# Patient Record
Sex: FEMALE | Race: WHITE | NOT HISPANIC OR LATINO | Employment: OTHER | URBAN - NONMETROPOLITAN AREA
[De-identification: names, ages, dates, MRNs, and addresses within clinical notes are randomized per-mention and may not be internally consistent; named-entity substitution may affect disease eponyms.]

---

## 2018-11-01 ENCOUNTER — OFFICE VISIT (OUTPATIENT)
Dept: CARDIOLOGY | Facility: CLINIC | Age: 63
End: 2018-11-01

## 2018-11-01 VITALS
HEIGHT: 65 IN | WEIGHT: 285 LBS | BODY MASS INDEX: 47.48 KG/M2 | DIASTOLIC BLOOD PRESSURE: 70 MMHG | HEART RATE: 96 BPM | SYSTOLIC BLOOD PRESSURE: 110 MMHG

## 2018-11-01 DIAGNOSIS — R01.1 MURMUR, CARDIAC: ICD-10-CM

## 2018-11-01 DIAGNOSIS — E78.1 HYPERTRIGLYCERIDEMIA: ICD-10-CM

## 2018-11-01 DIAGNOSIS — E88.81 METABOLIC SYNDROME: ICD-10-CM

## 2018-11-01 DIAGNOSIS — E03.8 OTHER SPECIFIED HYPOTHYROIDISM: ICD-10-CM

## 2018-11-01 DIAGNOSIS — R07.89 OTHER CHEST PAIN: ICD-10-CM

## 2018-11-01 DIAGNOSIS — E11.9 TYPE 2 DIABETES MELLITUS WITHOUT COMPLICATION, WITHOUT LONG-TERM CURRENT USE OF INSULIN (HCC): ICD-10-CM

## 2018-11-01 DIAGNOSIS — I10 ESSENTIAL HYPERTENSION: ICD-10-CM

## 2018-11-01 DIAGNOSIS — R94.31 ABNORMAL EKG: Primary | ICD-10-CM

## 2018-11-01 DIAGNOSIS — I25.6 SILENT MYOCARDIAL ISCHEMIA: ICD-10-CM

## 2018-11-01 PROCEDURE — 99204 OFFICE O/P NEW MOD 45 MIN: CPT | Performed by: INTERNAL MEDICINE

## 2018-11-01 PROCEDURE — 93000 ELECTROCARDIOGRAM COMPLETE: CPT | Performed by: INTERNAL MEDICINE

## 2018-11-01 RX ORDER — FUROSEMIDE 20 MG/1
20 TABLET ORAL 2 TIMES DAILY
COMMUNITY

## 2018-11-01 RX ORDER — CLONAZEPAM 0.5 MG/1
0.5 TABLET ORAL 2 TIMES DAILY
COMMUNITY

## 2018-11-01 RX ORDER — ALBUTEROL SULFATE 90 UG/1
2 AEROSOL, METERED RESPIRATORY (INHALATION) 2 TIMES DAILY
COMMUNITY

## 2018-11-01 RX ORDER — FENOFIBRATE 160 MG/1
160 TABLET ORAL DAILY
COMMUNITY

## 2018-11-01 RX ORDER — LEVOTHYROXINE SODIUM 0.15 MG/1
150 TABLET ORAL DAILY
COMMUNITY

## 2018-11-01 RX ORDER — LACTULOSE 10 G/15ML
10 SOLUTION ORAL DAILY
COMMUNITY

## 2018-11-01 RX ORDER — QUETIAPINE FUMARATE 200 MG/1
200 TABLET, FILM COATED ORAL NIGHTLY
COMMUNITY

## 2018-11-01 RX ORDER — GLIPIZIDE 5 MG/1
5 TABLET, FILM COATED, EXTENDED RELEASE ORAL DAILY
COMMUNITY

## 2018-11-01 RX ORDER — DOCUSATE SODIUM 100 MG/1
100 CAPSULE, LIQUID FILLED ORAL 2 TIMES DAILY
COMMUNITY

## 2018-11-01 RX ORDER — PRAZOSIN HYDROCHLORIDE 2 MG/1
4 CAPSULE ORAL NIGHTLY
COMMUNITY

## 2018-11-01 RX ORDER — PALIPERIDONE 9 MG/1
9 TABLET, EXTENDED RELEASE ORAL EVERY MORNING
COMMUNITY

## 2018-11-01 RX ORDER — ASCORBIC ACID 500 MG
1 TABLET ORAL DAILY
COMMUNITY

## 2018-11-01 RX ORDER — RANITIDINE 300 MG/1
300 TABLET ORAL NIGHTLY
COMMUNITY

## 2018-11-01 NOTE — PATIENT INSTRUCTIONS
Mediterranean Diet  A Mediterranean diet refers to food and lifestyle choices that are based on the traditions of countries located on the [place]. This way of eating has been shown to help prevent certain conditions and improve outcomes for people who have chronic diseases, like kidney disease and heart disease.  What are tips for following this plan?  Lifestyle  · Cook and eat meals together with your family, when possible.  · Drink enough fluid to keep your urine clear or pale yellow.  · Be physically active every day. This includes:  ? Aerobic exercise like running or swimming.  ? Leisure activities like gardening, walking, or housework.  · Get 7-8 hours of sleep each night.  · If recommended by your health care provider, drink red wine in moderation. This means 1 glass a day for nonpregnant women and 2 glasses a day for men. A glass of wine equals 5 oz (150 mL).  Reading food labels  · Check the serving size of packaged foods. For foods such as rice and pasta, the serving size refers to the amount of cooked product, not dry.  · Check the total fat in packaged foods. Avoid foods that have saturated fat or trans fats.  · Check the ingredients list for added sugars, such as corn syrup.  Shopping  · At the grocery store, buy most of your food from the areas near the walls of the store. This includes:  ? Fresh fruits and vegetables (produce).  ? Grains, beans, nuts, and seeds. Some of these may be available in unpackaged forms or large amounts (in bulk).  ? Fresh seafood.  ? Poultry and eggs.  ? Low-fat dairy products.  · Buy whole ingredients instead of prepackaged foods.  · Buy fresh fruits and vegetables in-season from local farmers markets.  · Buy frozen fruits and vegetables in resealable bags.  · If you do not have access to quality fresh seafood, buy precooked frozen shrimp or canned fish, such as tuna, salmon, or sardines.  · Buy small amounts of raw or cooked vegetables, salads, or olives from the  deli or salad bar at your store.  · Stock your pantry so you always have certain foods on hand, such as olive oil, canned tuna, canned tomatoes, rice, pasta, and beans.  Cooking  · Cook foods with extra-virgin olive oil instead of using butter or other vegetable oils.  · Have meat as a side dish, and have vegetables or grains as your main dish. This means having meat in small portions or adding small amounts of meat to foods like pasta or stew.  · Use beans or vegetables instead of meat in common dishes like chili or lasagna.  · [place] with different cooking methods. Try roasting or broiling vegetables instead of steaming or sautéeing them.  · Add frozen vegetables to soups, stews, pasta, or rice.  · Add nuts or seeds for added healthy fat at each meal. You can add these to yogurt, salads, or vegetable dishes.  · Marinate fish or vegetables using olive oil, lemon juice, garlic, and fresh herbs.  Meal planning  · Plan to eat 1 vegetarian meal one day each week. Try to work up to 2 vegetarian meals, if possible.  · Eat seafood 2 or more times a week.  · Have healthy snacks readily available, such as:  ? Vegetable sticks with hummus.  ? Greek yogurt.  ? Fruit and nut trail mix.  · Eat balanced meals throughout the week. This includes:  ? Fruit: 2-3 servings a day  ? Vegetables: 4-5 servings a day  ? Low-fat dairy: 2 servings a day  ? Fish, poultry, or lean meat: 1 serving a day  ? Beans and legumes: 2 or more servings a week  ? Nuts and seeds: 1-2 servings a day  ? Whole grains: 6-8 servings a day  ? Extra-virgin olive oil: 3-4 servings a day  · Limit red meat and sweets to only a few servings a month  What are my food choices?  · Mediterranean diet  ? Recommended  ? Grains: Whole-grain pasta. Brown rice. Bulgar wheat. Polenta. Couscous. Whole-wheat bread. Oatmeal. Quinoa.  ? Vegetables: Artichokes. Beets. Broccoli. Cabbage. Carrots. Eggplant. Green beans. Chard. Kale. Spinach. Onions. Leeks. Peas. Squash.  Tomatoes. Peppers. Radishes.  ? Fruits: Apples. Apricots. Avocado. Berries. Bananas. Cherries. Dates. Figs. Grapes. Jorge L. Melon. Oranges. Peaches. Plums. Pomegranate.  ? Meats and other protein foods: Beans. Almonds. Sunflower seeds. Pine nuts. Peanuts. Cod. [place]. Scallops. Shrimp. Tuna. Tilapia. Clams. Oysters. Eggs.  ? Dairy: Low-fat milk. Cheese. Greek yogurt.  ? Beverages: Water. Red wine. Herbal tea.  ? Fats and oils: Extra virgin olive oil. Avocado oil. Grape seed oil.  ? Sweets and desserts: Greek yogurt with honey. Baked apples. Poached pears. Trail mix.  ? Seasoning and other foods: Basil. Cilantro. Coriander. Cumin. Mint. Parsley. Lars. Rosemary. Tarragon. Garlic. Oregano. Thyme. Pepper. Balsalmic vinegar. Tahini. Hummus. Tomato sauce. Olives. Mushrooms.  ? Limit these  ? Grains: Prepackaged pasta or rice dishes. Prepackaged cereal with added sugar.  ? Vegetables: Deep fried potatoes (french fries).  ? Fruits: Fruit canned in syrup.  ? Meats and other protein foods: Beef. Pork. Lamb. Poultry with skin. Hot dogs. Thakkar.  ? Dairy: Ice cream. Sour cream. Whole milk.  ? Beverages: Juice. Sugar-sweetened soft drinks. Beer. Liquor and spirits.  ? Fats and oils: Butter. Canola oil. Vegetable oil. Beef fat (tallow). Lard.  ? Sweets and desserts: Cookies. Cakes. Pies. Candy.  ? Seasoning and other foods: Mayonnaise. Premade sauces and marinades.  ? The items listed may not be a complete list. Talk with your dietitian about what dietary choices are right for you.  Summary  · The Mediterranean diet includes both food and lifestyle choices.  · Eat a variety of fresh fruits and vegetables, beans, nuts, seeds, and whole grains.  · Limit the amount of red meat and sweets that you eat.  · Talk with your health care provider about whether it is safe for you to drink red wine in moderation. This means 1 glass a day for nonpregnant women and 2 glasses a day for men. A glass of wine equals 5 oz (150 mL).  This information  is not intended to replace advice given to you by your health care provider. Make sure you discuss any questions you have with your health care provider.  Document Released: 08/10/2017 Document Revised: 09/12/2017 Document Reviewed: 08/10/2017  [employer] Interactive Patient Education © 2018 [employer]

## 2018-11-01 NOTE — PROGRESS NOTES
"Chief Complaint   Patient presents with   • Establish Care     Patient referred due to abnormal EKG ( attempting to obtain EKG from PCP). Patient is unable to inform of any pain and unable to communicate needs, sister reports had one episode 2 years ago that she felt may have been some chest pain. Sister reports unable to weigh patient due to balance issues, she thinks she weighs 285lb.   • Shortness of Breath     sister reports that she has some with over exertion. Does have problems with balance and sister states \"she will freeze in place when she gets up\".         CARDIAC COMPLAINTS  chest pressure/discomfort and dyspnea      Subjective   Maru Vickers is a 62 y.o. female came in today with her sister and her caregiver.  She has history of mental retardation since childhood.  She has been taking care of by the family very well.  She is not able to communicate adequately.  They moved to [place] recently and was seen at your office.  She had an EKG which was done secondary to her psychiatrist's request to monitor her medications.  It was abnormal and hence she is referred for further evaluation.  Her sister, who came with her said that she did have an episode of chest pain about a year or 2 ago.  They were outside walking.  The patient apparently stopped and clutched her chest and apparently said chest pain.  She apparently doesn't like to walk much, and the family felt that was the reason, she was complaining.  After about 15 minutes, she was able to walk again.  They also noticed, that in the last 6 months she does get shortness of breath on exertion.  She also has been gaining weight, since she eats lot of unhealthy food. She apparently had labs done at your office.  She is not a smoker.  Diabetes, hypertension and ischemic heart disease does run in the family.    No past surgical history on file.    Current Outpatient Prescriptions   Medication Sig Dispense Refill   • albuterol (PROVENTIL HFA;VENTOLIN HFA) " 108 (90 Base) MCG/ACT inhaler Inhale 2 puffs 2 (Two) Times a Day.     • clonazePAM (KlonoPIN) 0.5 MG tablet Take 0.5 mg by mouth 2 (Two) Times a Day.     • Cyanocobalamin (VITAMIN B 12 PO) Take  by mouth Daily.     • docusate sodium (COLACE) 100 MG capsule Take 100 mg by mouth 2 (Two) Times a Day.     • fenofibrate 160 MG tablet Take 160 mg by mouth Daily.     • FOLIC ACID PO Take  by mouth Daily.     • furosemide (LASIX) 20 MG tablet Take 20 mg by mouth 2 (Two) Times a Day.     • glipiZIDE (GLUCOTROL XL) 5 MG ER tablet Take 5 mg by mouth Daily.     • lactulose (CHRONULAC) 10 GM/15ML solution Take 10 g by mouth Daily.     • levothyroxine (SYNTHROID, LEVOTHROID) 150 MCG tablet Take 150 mcg by mouth Daily.     • Multiple Vitamin (MULTIVITAMIN) tablet tablet Take 1 tablet by mouth Daily.     • paliperidone (INVEGA) 9 MG 24 hr tablet Take 9 mg by mouth Every Morning.     • prazosin (MINIPRESS) 2 MG capsule Take 4 mg by mouth Every Night.     • QUEtiapine (SEROquel) 200 MG tablet Take 200 mg by mouth Every Night.     • raNITIdine (ZANTAC) 300 MG tablet Take 300 mg by mouth Every Night.     • sitaGLIPtin-metFORMIN (JANUMET)  MG per tablet Take 1 tablet by mouth 2 (Two) Times a Day With Meals.     • aspirin 81 MG tablet Take 1 tablet by mouth Daily. 30 tablet 11     No current facility-administered medications for this visit.            ALLERGIES:  Patient has no known allergies.    Past Medical History:   Diagnosis Date   • Anemia    • Dementia    • Diabetes mellitus (CMS/HCC)    • GERD (gastroesophageal reflux disease)    • Hx of bilateral breast reduction surgery 1978   • Hx of dilation and curettage    • Hyperlipidemia    • Hypothyroidism    • Idiopathic orofacial dystonia    • Mental retardation    • Parkinson disease (CMS/HCC)    • Residual schizophrenia (CMS/HCC)    • Schizoid personality disorder (CMS/HCC)        History   Smoking Status   • Never Smoker   Smokeless Tobacco   • Never Used          Family  "History   Problem Relation Age of Onset   • Heart disease Mother    • Diabetes Mother    • Heart disease Father    • Heart attack Father    • Heart failure Father    • Cancer Father    • Thyroid disease Sister    • No Known Problems Brother    • Heart disease Paternal Grandmother    • Stroke Paternal Grandfather    • Heart disease Sister    • Heart attack Sister    • Hypertension Sister    • Diabetes Sister    • Parkinsonism Sister    • Lupus Sister    • Thyroid disease Sister    • No Known Problems Brother    • No Known Problems Brother        Review of Systems   Constitution: Positive for malaise/fatigue. Negative for decreased appetite.   HENT: Negative for congestion and sore throat.    Eyes: Negative for blurred vision.   Cardiovascular: Positive for chest pain and dyspnea on exertion.   Respiratory: Positive for shortness of breath. Negative for snoring.    Endocrine: Negative for cold intolerance and heat intolerance.   Hematologic/Lymphatic: Negative for adenopathy. Does not bruise/bleed easily.   Skin: Negative for itching, nail changes and skin cancer.   Musculoskeletal: Positive for arthritis. Negative for myalgias.   Gastrointestinal: Negative for abdominal pain, dysphagia and heartburn.   Genitourinary: Negative for bladder incontinence and frequency.   Neurological: Negative for dizziness, light-headedness, seizures and vertigo.   Psychiatric/Behavioral: Negative for altered mental status.   Allergic/Immunologic: Negative for environmental allergies and hives.       Diabetes- Yes  Thyroid- abnormal    Objective     /70 (BP Location: Right arm)   Pulse 96   Ht 165 cm (64.96\")   Wt 129 kg (285 lb)   BMI 47.48 kg/m²     Physical Exam   Constitutional: She is oriented to person, place, and time. She appears well-developed and well-nourished.   HENT:   Head: Normocephalic.   Nose: Nose normal.   Eyes: Pupils are equal, round, and reactive to light. EOM are normal.   Neck: Normal range of motion. " Neck supple.   Cardiovascular: Normal rate, regular rhythm, S1 normal and S2 normal.    Murmur heard.  Pulmonary/Chest: Effort normal and breath sounds normal.   Abdominal: Soft. Bowel sounds are normal.   Musculoskeletal: Normal range of motion. She exhibits edema.   Neurological: She is alert and oriented to person, place, and time.   Skin: Skin is warm and dry.   Psychiatric: She has a normal mood and affect.   Nursing note and vitals reviewed.        ECG 12 Lead  Date/Time: 11/1/2018 12:48 PM  Performed by: ATA DAMON  Authorized by: ATA DAMON   Comparison: compared with previous ECG from 10/30/2018  Similar to previous ECG  Rhythm: sinus rhythm  Rate: normal  QRS axis: left  Q waves: II, III and aVF  Clinical impression: abnormal ECG              Assessment/Plan   Patient's Body mass index is 47.48 kg/m². BMI is above normal parameters. Recommendations include: educational material and nutrition counseling.     Maru was seen today for establish care and shortness of breath.    Diagnoses and all orders for this visit:    Abnormal EKG  -     Stress Test With Myocardial Perfusion One Day; Future    Type 2 diabetes mellitus without complication, without long-term current use of insulin (CMS/[facility])  -     Hemoglobin A1c; Future    Essential hypertension  -     Comprehensive Metabolic Panel; Future    Hypertriglyceridemia  -     Lipid Panel; Future    Metabolic syndrome  -     CBC & Differential; Future    Other specified hypothyroidism  -     TSH; Future    Silent myocardial ischemia  -     Stress Test With Myocardial Perfusion One Day; Future    Other chest pain  -     Stress Test With Myocardial Perfusion One Day; Future  -     Adult Transthoracic Echo Complete W/ Cont if Necessary Per Protocol; Future  -     High Sensitivity CRP; Future    Murmur, cardiac  -     Adult Transthoracic Echo Complete W/ Cont if Necessary Per Protocol; Future     At baseline, her heart rate is upper limit of normal  and the blood pressure is stable.  Her EKG showed normal sinus rhythm, old inferior wall infarct and left axis deviation.  Her BMI is almost 48.  Her clinical examination is unremarkable other than, systolic murmur at the mitral area and mild pedal edema.  I had a very long talk with her and the sister.  I explained to them about the abnormal EKG.  I'm not sure whether the chest pain she had few years ago was an MI or not.  I scheduled her to undergo an echocardiogram, to evaluate the LV function, valvular structures and any wall motion abnormalities.  She also need a stress test in the form of Lexiscan to evaluate for any infarct and also to look for any potential ischemia.  If there is evidence of ischemia, then she needs to be treated more aggressively with beta blockers and statins.  Meanwhile, I gave him papers regarding different diet including Mediterranean diet.  Based on the results of these tests, further recommendations will be made.  I will really appreciate, if you can send me copy of her labs.               Electronically signed by Crystal Hunter MD November 1, 2018 12:39 PM

## 2018-11-12 ENCOUNTER — HOSPITAL ENCOUNTER (OUTPATIENT)
Dept: CARDIOLOGY | Facility: HOSPITAL | Age: 63
Discharge: HOME | End: 2018-11-12
Attending: INTERNAL MEDICINE

## 2018-11-12 ENCOUNTER — LAB (OUTPATIENT)
Dept: LAB | Facility: HOSPITAL | Age: 63
End: 2018-11-12
Attending: INTERNAL MEDICINE

## 2018-11-12 VITALS — BODY MASS INDEX: 47.38 KG/M2 | HEIGHT: 65 IN | WEIGHT: 284.39 LBS

## 2018-11-12 DIAGNOSIS — E78.1 HYPERTRIGLYCERIDEMIA: ICD-10-CM

## 2018-11-12 DIAGNOSIS — I25.6 SILENT MYOCARDIAL ISCHEMIA: ICD-10-CM

## 2018-11-12 DIAGNOSIS — E03.8 OTHER SPECIFIED HYPOTHYROIDISM: ICD-10-CM

## 2018-11-12 DIAGNOSIS — R07.89 OTHER CHEST PAIN: ICD-10-CM

## 2018-11-12 DIAGNOSIS — E11.9 TYPE 2 DIABETES MELLITUS WITHOUT COMPLICATION, WITHOUT LONG-TERM CURRENT USE OF INSULIN (HCC): ICD-10-CM

## 2018-11-12 DIAGNOSIS — R01.1 MURMUR, CARDIAC: ICD-10-CM

## 2018-11-12 DIAGNOSIS — E88.81 METABOLIC SYNDROME: ICD-10-CM

## 2018-11-12 DIAGNOSIS — R94.31 ABNORMAL EKG: ICD-10-CM

## 2018-11-12 DIAGNOSIS — I10 ESSENTIAL HYPERTENSION: ICD-10-CM

## 2018-11-12 LAB
ALBUMIN SERPL-MCNC: 4.2 G/DL (ref 3.4–4.8)
ALBUMIN/GLOB SERPL: 1.4 G/DL (ref 1.5–2.5)
ALP SERPL-CCNC: 53 U/L (ref 35–104)
ALT SERPL W P-5'-P-CCNC: 40 U/L (ref 10–36)
ANION GAP SERPL CALCULATED.3IONS-SCNC: 12.2 MMOL/L (ref 3.6–11.2)
AST SERPL-CCNC: 44 U/L (ref 10–30)
BASOPHILS # BLD AUTO: ABNORMAL 10*3/MM3 (ref 0–0.3)
BASOPHILS NFR BLD AUTO: ABNORMAL % (ref 0–2)
BH CV ECHO MEAS - AO MAX PG: 7.7 MMHG
BH CV ECHO MEAS - AO MEAN PG: 3.7 MMHG
BH CV ECHO MEAS - AO ROOT AREA (BSA CORRECTED): 1.2
BH CV ECHO MEAS - AO ROOT AREA: 6 CM^2
BH CV ECHO MEAS - AO ROOT DIAM: 2.8 CM
BH CV ECHO MEAS - AO V2 MAX: 138.8 CM/SEC
BH CV ECHO MEAS - AO V2 MEAN: 87.5 CM/SEC
BH CV ECHO MEAS - AO V2 VTI: 27 CM
BH CV ECHO MEAS - BSA(HAYCOCK): 2.5 M^2
BH CV ECHO MEAS - BSA: 2.3 M^2
BH CV ECHO MEAS - BZI_BMI: 47.4 KILOGRAMS/M^2
BH CV ECHO MEAS - BZI_METRIC_HEIGHT: 165.1 CM
BH CV ECHO MEAS - BZI_METRIC_WEIGHT: 129.3 KG
BH CV ECHO MEAS - EDV(CUBED): 125.5 ML
BH CV ECHO MEAS - EDV(TEICH): 118.6 ML
BH CV ECHO MEAS - EF(CUBED): 72.2 %
BH CV ECHO MEAS - EF(TEICH): 63.6 %
BH CV ECHO MEAS - ESV(CUBED): 34.9 ML
BH CV ECHO MEAS - ESV(TEICH): 43.1 ML
BH CV ECHO MEAS - FS: 34.7 %
BH CV ECHO MEAS - IVS/LVPW: 1.2
BH CV ECHO MEAS - IVSD: 1.1 CM
BH CV ECHO MEAS - LA DIMENSION: 4.3 CM
BH CV ECHO MEAS - LA/AO: 1.6
BH CV ECHO MEAS - LV IVRT: 0.08 SEC
BH CV ECHO MEAS - LV MASS(C)D: 189.9 GRAMS
BH CV ECHO MEAS - LV MASS(C)DI: 82.6 GRAMS/M^2
BH CV ECHO MEAS - LVIDD: 5 CM
BH CV ECHO MEAS - LVIDS: 3.3 CM
BH CV ECHO MEAS - LVPWD: 0.92 CM
BH CV ECHO MEAS - MITRAL HR: 160.6 BPM
BH CV ECHO MEAS - MITRAL R-R: 0.37 SEC
BH CV ECHO MEAS - MV A MAX VEL: 147.2 CM/SEC
BH CV ECHO MEAS - MV DEC SLOPE: 354.3 CM/SEC^2
BH CV ECHO MEAS - MV DEC TIME: 0.27 SEC
BH CV ECHO MEAS - MV E MAX VEL: 94.2 CM/SEC
BH CV ECHO MEAS - MV E/A: 0.64
BH CV ECHO MEAS - MV MAX PG: 10.3 MMHG
BH CV ECHO MEAS - MV MEAN PG: 4.1 MMHG
BH CV ECHO MEAS - MV V2 MAX: 160.4 CM/SEC
BH CV ECHO MEAS - MV V2 MEAN: 93.2 CM/SEC
BH CV ECHO MEAS - MV V2 VTI: 34.8 CM
BH CV ECHO MEAS - PA MAX PG: 6.3 MMHG
BH CV ECHO MEAS - PA MEAN PG: 3.6 MMHG
BH CV ECHO MEAS - PA V2 MAX: 125.2 CM/SEC
BH CV ECHO MEAS - PA V2 MEAN: 89.5 CM/SEC
BH CV ECHO MEAS - PA V2 VTI: 26.6 CM
BH CV ECHO MEAS - PULM. HR: 201.6 BPM
BH CV ECHO MEAS - PULM. R-R: 0.3 SEC
BH CV ECHO MEAS - RAP SYSTOLE: 10 MMHG
BH CV ECHO MEAS - RVDD: 3.1 CM
BH CV ECHO MEAS - RVSP: 49.3 MMHG
BH CV ECHO MEAS - SI(AO): 70.6 ML/M^2
BH CV ECHO MEAS - SI(CUBED): 39.4 ML/M^2
BH CV ECHO MEAS - SI(TEICH): 32.8 ML/M^2
BH CV ECHO MEAS - SV(AO): 162.4 ML
BH CV ECHO MEAS - SV(CUBED): 90.6 ML
BH CV ECHO MEAS - SV(TEICH): 75.5 ML
BH CV ECHO MEAS - TR MAX VEL: 313.3 CM/SEC
BH CV STRESS COMMENTS STAGE 1: NORMAL
BH CV STRESS DOSE REGADENOSON STAGE 1: 0.4
BH CV STRESS DURATION MIN STAGE 1: 0
BH CV STRESS DURATION SEC STAGE 1: 10
BH CV STRESS PROTOCOL 1: NORMAL
BH CV STRESS RECOVERY BP: NORMAL MMHG
BH CV STRESS RECOVERY HR: 92 BPM
BH CV STRESS STAGE 1: 1
BILIRUB SERPL-MCNC: 0.3 MG/DL (ref 0.2–1.8)
BUN BLD-MCNC: 22 MG/DL (ref 7–21)
BUN/CREAT SERPL: 28.2 (ref 7–25)
CALCIUM SPEC-SCNC: 9.6 MG/DL (ref 7.7–10)
CHLORIDE SERPL-SCNC: 104 MMOL/L (ref 99–112)
CHOLEST SERPL-MCNC: 181 MG/DL (ref 0–200)
CO2 SERPL-SCNC: 23.8 MMOL/L (ref 24.3–31.9)
CREAT BLD-MCNC: 0.78 MG/DL (ref 0.43–1.29)
DEPRECATED RDW RBC AUTO: 51.7 FL (ref 37–54)
EOSINOPHIL # BLD AUTO: ABNORMAL 10*3/MM3 (ref 0–0.7)
EOSINOPHIL NFR BLD AUTO: ABNORMAL % (ref 0–5)
ERYTHROCYTE [DISTWIDTH] IN BLOOD BY AUTOMATED COUNT: 16.3 % (ref 11.5–14.5)
GFR SERPL CREATININE-BSD FRML MDRD: 75 ML/MIN/1.73
GLOBULIN UR ELPH-MCNC: 3.1 GM/DL
GLUCOSE BLD-MCNC: 196 MG/DL (ref 70–110)
HBA1C MFR BLD: 7.6 % (ref 4.5–5.7)
HCT VFR BLD AUTO: 39.4 % (ref 37–47)
HDLC SERPL-MCNC: 27 MG/DL (ref 60–100)
HGB BLD-MCNC: 12.1 G/DL (ref 12–16)
LDLC SERPL CALC-MCNC: ABNORMAL MG/DL (ref 0–100)
LDLC/HDLC SERPL: ABNORMAL {RATIO}
LV EF NUC BP: 68 %
LYMPHOCYTES # BLD AUTO: ABNORMAL 10*3/MM3 (ref 1–3)
LYMPHOCYTES NFR BLD AUTO: ABNORMAL % (ref 21–51)
MAXIMAL PREDICTED HEART RATE: 158 BPM
MAXIMAL PREDICTED HEART RATE: 158 BPM
MCH RBC QN AUTO: 26.7 PG (ref 27–33)
MCHC RBC AUTO-ENTMCNC: 30.7 G/DL (ref 33–37)
MCV RBC AUTO: 87 FL (ref 80–94)
MONOCYTES # BLD AUTO: ABNORMAL 10*3/MM3 (ref 0.1–0.9)
MONOCYTES NFR BLD AUTO: ABNORMAL % (ref 0–10)
NEUTROPHILS # BLD AUTO: ABNORMAL 10*3/MM3 (ref 1.4–6.5)
NEUTROPHILS NFR BLD AUTO: ABNORMAL % (ref 30–70)
OSMOLALITY SERPL CALC.SUM OF ELEC: 288.1 MOSM/KG (ref 273–305)
PERCENT MAX PREDICTED HR: 70.25 %
PLATELET # BLD AUTO: 285 10*3/MM3 (ref 130–400)
PMV BLD AUTO: 12.5 FL (ref 6–10)
POTASSIUM BLD-SCNC: 4 MMOL/L (ref 3.5–5.3)
PROT SERPL-MCNC: 7.3 G/DL (ref 6–8)
RBC # BLD AUTO: 4.53 10*6/MM3 (ref 4.2–5.4)
SODIUM BLD-SCNC: 140 MMOL/L (ref 135–153)
STRESS BASELINE BP: NORMAL MMHG
STRESS BASELINE HR: 88 BPM
STRESS PERCENT HR: 83 %
STRESS POST PEAK BP: NORMAL MMHG
STRESS POST PEAK HR: 111 BPM
STRESS TARGET HR: 134 BPM
STRESS TARGET HR: 134 BPM
TRIGL SERPL-MCNC: 583 MG/DL (ref 0–150)
TSH SERPL DL<=0.05 MIU/L-ACNC: 3.47 MIU/ML (ref 0.55–4.78)
VLDLC SERPL-MCNC: ABNORMAL MG/DL
WBC NRBC COR # BLD: 6.35 10*3/MM3 (ref 4.5–12.5)

## 2018-11-12 PROCEDURE — 80061 LIPID PANEL: CPT | Performed by: INTERNAL MEDICINE

## 2018-11-12 PROCEDURE — 78452 HT MUSCLE IMAGE SPECT MULT: CPT

## 2018-11-12 PROCEDURE — 93306 TTE W/DOPPLER COMPLETE: CPT | Performed by: INTERNAL MEDICINE

## 2018-11-12 PROCEDURE — 0 TECHNETIUM SESTAMIBI: Performed by: INTERNAL MEDICINE

## 2018-11-12 PROCEDURE — 36415 COLL VENOUS BLD VENIPUNCTURE: CPT

## 2018-11-12 PROCEDURE — 25010000002 REGADENOSON 0.4 MG/5ML SOLUTION: Performed by: INTERNAL MEDICINE

## 2018-11-12 PROCEDURE — 93018 CV STRESS TEST I&R ONLY: CPT | Performed by: INTERNAL MEDICINE

## 2018-11-12 PROCEDURE — 93306 TTE W/DOPPLER COMPLETE: CPT

## 2018-11-12 PROCEDURE — 80053 COMPREHEN METABOLIC PANEL: CPT | Performed by: INTERNAL MEDICINE

## 2018-11-12 PROCEDURE — 85025 COMPLETE CBC W/AUTO DIFF WBC: CPT | Performed by: INTERNAL MEDICINE

## 2018-11-12 PROCEDURE — 84443 ASSAY THYROID STIM HORMONE: CPT | Performed by: INTERNAL MEDICINE

## 2018-11-12 PROCEDURE — 93017 CV STRESS TEST TRACING ONLY: CPT

## 2018-11-12 PROCEDURE — 86141 C-REACTIVE PROTEIN HS: CPT | Performed by: INTERNAL MEDICINE

## 2018-11-12 PROCEDURE — 78452 HT MUSCLE IMAGE SPECT MULT: CPT | Performed by: INTERNAL MEDICINE

## 2018-11-12 PROCEDURE — 83036 HEMOGLOBIN GLYCOSYLATED A1C: CPT | Performed by: INTERNAL MEDICINE

## 2018-11-12 PROCEDURE — A9500 TC99M SESTAMIBI: HCPCS | Performed by: INTERNAL MEDICINE

## 2018-11-12 RX ADMIN — TECHNETIUM TC 99M SESTAMIBI 1 DOSE: 1 INJECTION INTRAVENOUS at 09:37

## 2018-11-12 RX ADMIN — REGADENOSON 0.4 MG: 0.08 INJECTION, SOLUTION INTRAVENOUS at 09:37

## 2018-11-13 NOTE — PROGRESS NOTES
Pt's sister's aware of results and recommendations to continue current regimen and that we will continue to monitor. They have requested a copy of testing and labs be faxed to DR JOHN Powers.

## 2018-11-14 LAB — CRP SERPL HS-MCNC: 26.57 MG/L (ref 0–3)

## 2018-11-19 ENCOUNTER — TELEPHONE (OUTPATIENT)
Dept: CARDIOLOGY | Facility: CLINIC | Age: 63
End: 2018-11-19

## 2018-11-19 RX ORDER — ROSUVASTATIN CALCIUM 20 MG/1
20 TABLET, COATED ORAL DAILY
Qty: 90 TABLET | Refills: 3 | Status: SHIPPED | OUTPATIENT
Start: 2018-11-19 | End: 2018-11-20 | Stop reason: SDUPTHER

## 2018-11-20 ENCOUNTER — PRIOR AUTHORIZATION (OUTPATIENT)
Dept: CARDIOLOGY | Facility: CLINIC | Age: 63
End: 2018-11-20

## 2018-11-20 RX ORDER — ROSUVASTATIN CALCIUM 20 MG/1
20 TABLET, COATED ORAL DAILY
Qty: 90 TABLET | Refills: 4 | Status: SHIPPED | OUTPATIENT
Start: 2018-11-20 | End: 2019-03-26 | Stop reason: ALTCHOICE

## 2019-03-01 ENCOUNTER — TELEPHONE (OUTPATIENT)
Dept: CARDIOLOGY | Facility: CLINIC | Age: 64
End: 2019-03-01

## 2019-03-01 NOTE — TELEPHONE ENCOUNTER
LM for pt's sister Heidi to return my call to clarify message asking for sooner appointment and med change.

## 2019-03-05 NOTE — TELEPHONE ENCOUNTER
Spoke with Narda, she has rescheduled appointment, has enough Crestor to make it to the appointment.  States will discuss at that time.

## 2019-03-26 ENCOUNTER — OFFICE VISIT (OUTPATIENT)
Dept: CARDIOLOGY | Facility: CLINIC | Age: 64
End: 2019-03-26

## 2019-03-26 VITALS
HEART RATE: 80 BPM | DIASTOLIC BLOOD PRESSURE: 74 MMHG | HEIGHT: 65 IN | BODY MASS INDEX: 47.38 KG/M2 | SYSTOLIC BLOOD PRESSURE: 118 MMHG

## 2019-03-26 DIAGNOSIS — E88.81 METABOLIC SYNDROME: ICD-10-CM

## 2019-03-26 DIAGNOSIS — E11.9 TYPE 2 DIABETES MELLITUS WITHOUT COMPLICATION, WITHOUT LONG-TERM CURRENT USE OF INSULIN (HCC): ICD-10-CM

## 2019-03-26 DIAGNOSIS — R94.31 ABNORMAL EKG: Primary | ICD-10-CM

## 2019-03-26 DIAGNOSIS — I27.20 PULMONARY HYPERTENSION (HCC): ICD-10-CM

## 2019-03-26 DIAGNOSIS — E78.1 HYPERTRIGLYCERIDEMIA: ICD-10-CM

## 2019-03-26 PROCEDURE — 99213 OFFICE O/P EST LOW 20 MIN: CPT | Performed by: NURSE PRACTITIONER

## 2019-03-26 RX ORDER — ATORVASTATIN CALCIUM 20 MG/1
20 TABLET, FILM COATED ORAL DAILY
Qty: 90 TABLET | Refills: 3 | Status: SHIPPED | OUTPATIENT
Start: 2019-03-26 | End: 2019-06-21 | Stop reason: ALTCHOICE

## 2019-03-26 NOTE — PROGRESS NOTES
"Chief Complaint   Patient presents with   • Follow-up     Cardiac management. Sister states \"medicare is not going to pay for Crestor, we needs alternative, she will no longer have any this week\". Unable to weigh patient, sister reports weight at 290lb. No medication list with patient, sister states \"medications have not changed\".       Kojo Vickers is a 63 y.o. female with history of mental retardation but no significant past cardiac history who was referred for her initial cardiac evaluation in November 2018 secondary to abnormal EKG for psychiatric medication monitoring which showed inferior infarct.  She lives with her sister who cares for her and did report an episode of remote chest pain that occurred 1-2 years ago while walking outside.  She had clutched her chest which resolved after she rested.  No more episodes after this.  Because of the abnormal EKG and risk factors, cardiac workup was completed.  Lexiscan stress showed no infarct or ischemia.  Echo showed EF 65%, mild MR, and moderately elevated PA pressure.  No medication changes were made.  Labs on 11/12/2018 showed dyslipidemia with , triglycerides 583, and HDL 27.  A1C 7.6%. ALT/AST 40/44, GFR normal at 75, as for text TSH 3.47, CRP elevated at 26.  Crestor 20 mg daily added.  Aspirin continued.  She came today for follow-up visit with her sister.  She denies any acute complaints.  No chest pain, no witnessed shortness of breath.  Crestor will no longer be covered on insurance, requesting alternative.  Her sister does report that Ms. Vickers has difficulty sleeping she takes multiple medications at bedtime then wakes up in the middle the night for a snack and cartoons.  They are working on her diet.  HPI         Cardiac History:    Past Surgical History:   Procedure Laterality Date   • CARDIOVASCULAR STRESS TEST  11/12/2018    L. Cardiolite- Negative   • ECHO - CONVERTED  11/12/2018    TLS. EF 60-65%. Mild MR. RVSP- 49 mmHg. "       Current Outpatient Medications   Medication Sig Dispense Refill   • albuterol (PROVENTIL HFA;VENTOLIN HFA) 108 (90 Base) MCG/ACT inhaler Inhale 2 puffs 2 (Two) Times a Day.     • aspirin 81 MG tablet Take 1 tablet by mouth Daily. 30 tablet 11   • atorvastatin (LIPITOR) 20 MG tablet Take 1 tablet by mouth Daily. 90 tablet 3   • clonazePAM (KlonoPIN) 0.5 MG tablet Take 0.5 mg by mouth 2 (Two) Times a Day.     • Cyanocobalamin (VITAMIN B 12 PO) Take  by mouth Daily.     • docusate sodium (COLACE) 100 MG capsule Take 100 mg by mouth 2 (Two) Times a Day.     • fenofibrate 160 MG tablet Take 160 mg by mouth Daily.     • FOLIC ACID PO Take  by mouth Daily.     • furosemide (LASIX) 20 MG tablet Take 20 mg by mouth 2 (Two) Times a Day.     • glipiZIDE (GLUCOTROL XL) 5 MG ER tablet Take 5 mg by mouth Daily.     • lactulose (CHRONULAC) 10 GM/15ML solution Take 10 g by mouth Daily.     • levothyroxine (SYNTHROID, LEVOTHROID) 150 MCG tablet Take 150 mcg by mouth Daily.     • Multiple Vitamin (MULTIVITAMIN) tablet tablet Take 1 tablet by mouth Daily.     • paliperidone (INVEGA) 9 MG 24 hr tablet Take 9 mg by mouth Every Morning.     • prazosin (MINIPRESS) 2 MG capsule Take 4 mg by mouth Every Night.     • QUEtiapine (SEROquel) 200 MG tablet Take 200 mg by mouth Every Night.     • raNITIdine (ZANTAC) 300 MG tablet Take 300 mg by mouth Every Night.     • sitaGLIPtin-metFORMIN (JANUMET)  MG per tablet Take 1 tablet by mouth 2 (Two) Times a Day With Meals.       No current facility-administered medications for this visit.        Patient has no known allergies.    Past Medical History:   Diagnosis Date   • Anemia    • Dementia    • Diabetes mellitus (CMS/HCC)    • GERD (gastroesophageal reflux disease)    • Hx of bilateral breast reduction surgery 1978   • Hx of dilation and curettage    • Hyperlipidemia    • Hypothyroidism    • Idiopathic orofacial dystonia    • Mental retardation    • Parkinson disease (CMS/HCC)    •  "Residual schizophrenia (CMS/HCC)    • Schizoid personality disorder (CMS/HCC)        Social History     Socioeconomic History   • Marital status: Single     Spouse name: Not on file   • Number of children: Not on file   • Years of education: Not on file   • Highest education level: Not on file   Tobacco Use   • Smoking status: Never Smoker   • Smokeless tobacco: Never Used   Substance and Sexual Activity   • Alcohol use: No   • Drug use: No       Family History   Problem Relation Age of Onset   • Heart disease Mother    • Diabetes Mother    • Heart disease Father    • Heart attack Father    • Heart failure Father    • Cancer Father    • Thyroid disease Sister    • No Known Problems Brother    • Heart disease Paternal Grandmother    • Stroke Paternal Grandfather    • Heart disease Sister    • Heart attack Sister    • Hypertension Sister    • Diabetes Sister    • Parkinsonism Sister    • Lupus Sister    • Thyroid disease Sister    • No Known Problems Brother    • No Known Problems Brother        Review of Systems   Constitution: Positive for weight gain. Negative for decreased appetite, weakness and malaise/fatigue.   HENT: Negative.    Eyes: Negative.    Cardiovascular: Positive for leg swelling. Negative for chest pain, dyspnea on exertion, palpitations and syncope.   Respiratory: Negative for cough and shortness of breath.    Endocrine: Negative.    Hematologic/Lymphatic: Negative.    Skin: Negative.    Musculoskeletal: Negative for falls.   Gastrointestinal: Negative for abdominal pain and melena.   Genitourinary: Negative for dysuria and hematuria.   Neurological: Negative for dizziness.   Psychiatric/Behavioral: Positive for altered mental status (Cognitively delayed). Negative for depression.   Allergic/Immunologic: Negative.       Diabetes- Yes  Thyroid-normal    Objective     /74 (BP Location: Left arm)   Pulse 80   Ht 165 cm (64.96\")   BMI 47.38 kg/m²      Physical Exam   Constitutional: She is " oriented to person, place, and time. She appears well-developed and well-nourished. No distress.   HENT:   Head: Normocephalic and atraumatic.   Eyes: Pupils are equal, round, and reactive to light.   Neck: Normal range of motion.   Cardiovascular: Normal rate, regular rhythm and intact distal pulses.   Murmur heard.  Pulmonary/Chest: Effort normal and breath sounds normal. No respiratory distress. She has no wheezes.   Abdominal: Soft. Bowel sounds are normal.   Musculoskeletal: Normal range of motion. She exhibits edema (Legs and compression hose and wrapped).   She is on a walker   Neurological: She is alert and oriented to person, place, and time.   Patient is mostly nonverbal   Skin: Skin is warm and dry. She is not diaphoretic.   Psychiatric: She has a normal mood and affect.   Nursing note and vitals reviewed.     Procedures          Assessment/Plan    Heart rate and blood pressure are stable. BMI is elevated.  We reviewed her EKG as well as reports from her cardiac workup.  Stress test did not show any infarct or ischemia.  Echocardiogram showed normal LVEF with increase in PA pressure.  Continue Lasix.  Weight loss will be beneficial but may be challenging for her family.  Attempt to limit sugar and carbohydrate intake to reduce triglycerides as well as blood sugars.  Her activity is limited as she is on a walker.  Consider sleep apnea workup if you feel she would tolerate CPAP.  Labs reviewed showing dyslipidemia with elevated triglycerides and low HDL since Crestor is no longer covered, recommend changing this to Lipitor which will be added to fenofibrate.  Prescription sent to her pharmacy.  She will continue to be managed conservatively as long as she remains asymptomatic.  She appears stable.  We will see her back in 6 months or sooner if needed.     Maru was seen today for follow-up.    Diagnoses and all orders for this visit:    Abnormal EKG    Hypertriglyceridemia  -     atorvastatin (LIPITOR) 20  MG tablet; Take 1 tablet by mouth Daily.    Type 2 diabetes mellitus without complication, without long-term current use of insulin (CMS/HCC)    Metabolic syndrome  -     atorvastatin (LIPITOR) 20 MG tablet; Take 1 tablet by mouth Daily.    Pulmonary hypertension (CMS/HCC)        Patient's Body mass index is 47.38 kg/m². BMI is above normal parameters. Recommendations include: nutrition counseling.               Electronically signed by ROSELINE Christine,  March 28, 2019 11:43 AM

## 2019-03-28 PROBLEM — I27.20 PULMONARY HYPERTENSION (HCC): Status: ACTIVE | Noted: 2019-03-28

## 2019-06-13 ENCOUNTER — PRIOR AUTHORIZATION (OUTPATIENT)
Dept: CARDIOLOGY | Facility: CLINIC | Age: 64
End: 2019-06-13

## 2019-06-13 ENCOUNTER — TELEPHONE (OUTPATIENT)
Dept: CARDIOLOGY | Facility: CLINIC | Age: 64
End: 2019-06-13

## 2019-06-13 NOTE — TELEPHONE ENCOUNTER
Daly Stacy changed Crestor to Atorvastatin at last visit due to no longer covered on insurance. However, as PA done for a reauthorization of Crestor and it was approved, is it ok to change back to Crestor 20 mg?

## 2019-06-21 RX ORDER — ROSUVASTATIN CALCIUM 20 MG/1
20 TABLET, COATED ORAL DAILY
Qty: 90 TABLET | Refills: 3 | Status: SHIPPED | OUTPATIENT
Start: 2019-06-21

## 2019-06-21 NOTE — TELEPHONE ENCOUNTER
Neelima contacted patient through [employer], informing her of approval of Rosuvastatin. I sent script to Pharmacy

## 2019-06-21 NOTE — TELEPHONE ENCOUNTER
Patient has not returned my call. I attempted to reach her again this morning but NA. LM for her to call back